# Patient Record
Sex: FEMALE | Race: WHITE
[De-identification: names, ages, dates, MRNs, and addresses within clinical notes are randomized per-mention and may not be internally consistent; named-entity substitution may affect disease eponyms.]

---

## 2018-01-21 ENCOUNTER — HOSPITAL ENCOUNTER (EMERGENCY)
Dept: HOSPITAL 35 - ER | Age: 48
Discharge: HOME | End: 2018-01-21
Payer: COMMERCIAL

## 2018-01-21 VITALS — DIASTOLIC BLOOD PRESSURE: 93 MMHG | SYSTOLIC BLOOD PRESSURE: 137 MMHG

## 2018-01-21 VITALS — HEIGHT: 66 IN | BODY MASS INDEX: 27.32 KG/M2 | WEIGHT: 170 LBS

## 2018-01-21 DIAGNOSIS — Z90.710: ICD-10-CM

## 2018-01-21 DIAGNOSIS — Z98.890: ICD-10-CM

## 2018-01-21 DIAGNOSIS — R01.1: ICD-10-CM

## 2018-01-21 DIAGNOSIS — I80.9: Primary | ICD-10-CM

## 2018-09-27 ENCOUNTER — HOSPITAL ENCOUNTER (OUTPATIENT)
Dept: HOSPITAL 35 - NEURO | Age: 48
End: 2018-09-27
Attending: PSYCHIATRY & NEUROLOGY
Payer: COMMERCIAL

## 2018-09-27 DIAGNOSIS — R55: ICD-10-CM

## 2018-09-27 DIAGNOSIS — R42: ICD-10-CM

## 2018-09-27 DIAGNOSIS — R51: Primary | ICD-10-CM

## 2021-01-11 ENCOUNTER — HOSPITAL ENCOUNTER (INPATIENT)
Dept: HOSPITAL 35 - ER | Age: 51
LOS: 2 days | Discharge: HOME | DRG: 64 | End: 2021-01-13
Attending: FAMILY MEDICINE | Admitting: FAMILY MEDICINE
Payer: COMMERCIAL

## 2021-01-11 VITALS — SYSTOLIC BLOOD PRESSURE: 161 MMHG | DIASTOLIC BLOOD PRESSURE: 105 MMHG

## 2021-01-11 VITALS — SYSTOLIC BLOOD PRESSURE: 157 MMHG | DIASTOLIC BLOOD PRESSURE: 106 MMHG

## 2021-01-11 VITALS — DIASTOLIC BLOOD PRESSURE: 106 MMHG | SYSTOLIC BLOOD PRESSURE: 157 MMHG

## 2021-01-11 VITALS — HEIGHT: 65.98 IN | BODY MASS INDEX: 25.71 KG/M2 | WEIGHT: 160 LBS

## 2021-01-11 VITALS — SYSTOLIC BLOOD PRESSURE: 223 MMHG | DIASTOLIC BLOOD PRESSURE: 133 MMHG

## 2021-01-11 VITALS — DIASTOLIC BLOOD PRESSURE: 93 MMHG | SYSTOLIC BLOOD PRESSURE: 147 MMHG

## 2021-01-11 VITALS — SYSTOLIC BLOOD PRESSURE: 198 MMHG | DIASTOLIC BLOOD PRESSURE: 107 MMHG

## 2021-01-11 VITALS — DIASTOLIC BLOOD PRESSURE: 106 MMHG | SYSTOLIC BLOOD PRESSURE: 172 MMHG

## 2021-01-11 DIAGNOSIS — I16.0: ICD-10-CM

## 2021-01-11 DIAGNOSIS — Z79.899: ICD-10-CM

## 2021-01-11 DIAGNOSIS — U07.1: ICD-10-CM

## 2021-01-11 DIAGNOSIS — Z90.710: ICD-10-CM

## 2021-01-11 DIAGNOSIS — Z90.49: ICD-10-CM

## 2021-01-11 DIAGNOSIS — D75.1: ICD-10-CM

## 2021-01-11 DIAGNOSIS — M35.9: ICD-10-CM

## 2021-01-11 DIAGNOSIS — I63.89: Primary | ICD-10-CM

## 2021-01-11 DIAGNOSIS — I10: ICD-10-CM

## 2021-01-11 LAB
ALBUMIN SERPL-MCNC: 4.4 G/DL (ref 3.4–5)
ALT SERPL-CCNC: 17 U/L (ref 14–59)
ANION GAP SERPL CALC-SCNC: 11 MMOL/L (ref 7–16)
ANISOCYTOSIS BLD QL SMEAR: (no result)
AST SERPL-CCNC: 23 U/L (ref 15–37)
BASOPHILS NFR BLD AUTO: 2 % (ref 0–2)
BILIRUB SERPL-MCNC: 1 MG/DL (ref 0.2–1)
BILIRUB UR-MCNC: NEGATIVE MG/DL
BUN SERPL-MCNC: 17 MG/DL (ref 7–18)
CALCIUM SERPL-MCNC: 9.7 MG/DL (ref 8.5–10.1)
CHLORIDE SERPL-SCNC: 101 MMOL/L (ref 98–107)
CO2 SERPL-SCNC: 24 MMOL/L (ref 21–32)
COLOR UR: YELLOW
CREAT SERPL-MCNC: 1 MG/DL (ref 0.6–1)
EOSINOPHIL NFR BLD: 1 % (ref 0–3)
ERYTHROCYTE [DISTWIDTH] IN BLOOD BY AUTOMATED COUNT: 17.8 % (ref 10.5–14.5)
GLUCOSE SERPL-MCNC: 93 MG/DL (ref 74–106)
GRANULOCYTES NFR BLD MANUAL: 82 % (ref 36–66)
HCT VFR BLD CALC: 65.6 % (ref 37–47)
HGB BLD-MCNC: 21 GM/DL (ref 12–15)
KETONES UR STRIP-MCNC: NEGATIVE MG/DL
LG PLATELETS BLD QL SMEAR: (no result)
LYMPHOCYTES NFR BLD AUTO: 9 % (ref 24–44)
MCH RBC QN AUTO: 28.1 PG (ref 26–34)
MCHC RBC AUTO-ENTMCNC: 32 G/DL (ref 28–37)
MCV RBC: 87.8 FL (ref 80–100)
MONOCYTES NFR BLD: 5 % (ref 1–8)
NEUTROPHILS # BLD: 10.1 THOU/UL (ref 1.4–8.2)
NEUTS BAND NFR BLD: 1 % (ref 0–8)
PLATELET # BLD: 341 THOU/UL (ref 150–400)
POTASSIUM SERPL-SCNC: 4.1 MMOL/L (ref 3.5–5.1)
PROT SERPL-MCNC: 8.2 G/DL (ref 6.4–8.2)
RBC # BLD AUTO: 7.42 MIL/UL (ref 4.2–5)
RBC # UR STRIP: (no result) /UL
SODIUM SERPL-SCNC: 136 MMOL/L (ref 136–145)
SP GR UR STRIP: <= 1.005 (ref 1–1.03)
URINE CLARITY: CLEAR
URINE GLUCOSE-RANDOM*: NEGATIVE
URINE LEUKOCYTES-REFLEX: NEGATIVE
URINE NITRITE-REFLEX: NEGATIVE
URINE PROTEIN (DIPSTICK): NEGATIVE
UROBILINOGEN UR STRIP-ACNC: 0.2 E.U./DL (ref 0.2–1)
WBC # BLD AUTO: 12.2 THOU/UL (ref 4–11)

## 2021-01-11 PROCEDURE — 10081 I&D PILONIDAL CYST COMP: CPT

## 2021-01-11 PROCEDURE — 10879: CPT

## 2021-01-11 NOTE — HC
[place]   [address]                     CONSULTATION                  
_______________________________________________________________________________
 
Name:       XIAO MOORE   Room #:         170-9       ADM IN  
M.R.#:      [id number]                       Account #:      [id number]  
Admission:  01/11/21    Attend Phys:    Grayson Rdz MD  
Discharge:              Date of Birth:  [birth year]  
                                                          Report #: [id number]
                                                                    [id number]   
_______________________________________________________________________________
THIS REPORT FOR:  
 
cc:  Grayson Rdz MD, Neal A. MD Khosla,Eliud VÁZQUEZ MD                                         ~
 
DATE OF SERVICE:  01/11/2021
 
 
HISTORY OF PRESENT ILLNESS:  This is a 50-year-old female patient on whom I got
a call from Emergency Room because the patient had presented with some
confusion, where she was unable to do her regular job.  Emergency Room physician
did initially the CT and subsequently an MRI of the brain with and without
contrast and that demonstrated strokes, one in the left caudate nucleus area and
the other one on the right side.  Initially, they were not certain, but after
MRI of the brain with and without contrast, their impression was that they are
stroke.  The vasculature looks clean and I suggested dual antiplatelet therapy
and admission for further workup.
 
I came to see the patient in the Emergency Room; and at that time, Emergency
Room physician told me that the patient's hematocrit came out to be 65.6.  They
had already talked to Hematology and they were doing phlebotomy on her. 
Subsequently, Dr. Rdz came to Emergency Room and I discussed the patient
with him and it looks like her polycythemia is something recent.  Apparently,
her MARIALUISA has been positive according to Dr. Rdz.
 
She indicates she is not sure when these symptoms started.  She said she went to
work today and that is the time she noticed.  She went to work on Friday and she
was okay and she estimates this happened sometime between Friday and today and
these strokes are already showing up on the CT scan and on T2-weighted images
indicating that they are probably subacute.
 
REVIEW OF SYSTEMS:  A 14-point review of system was carried out and it looks
like this patient has an uncontrolled hypertension for a long time.  When she
came to Emergency Room, her blood pressure was 223/133.  She says she takes her
blood pressure, but the blood pressure runs about 190 systolic.  She was
somewhat defensive and circumvent about that.  She drinks alcohol very rarely,
about once a week or once every other week.  She does not have an established
diagnosis of polycythemia.  That was a relevant 14-point review of system.
 
PAST MEDICAL HISTORY:  Negative for stroke.
 
FAMILY HISTORY:  Negative for any polycythemia.
 
SOCIAL HISTORY:  She drinks alcohol very rarely and apparently does not smoke.
 
 
 
 

[address]
[place]   [address]                     CONSULTATION                  
_______________________________________________________________________________
 
Name:       XIAO MOORE   Room #:         1709       [facility] [place]  
.R.#:      [id number]                       Account #:      [id number]  
Admission:  01/11/21    Attend Phys:    Grayson Rdz MD  
Discharge:              Date of Birth:  [birth year]  
                                                          Report #: [id number]
                                                                    [id number]   
_______________________________________________________________________________
 
PHYSICAL EXAMINATION:  Indicates she is alert.  She is responsive.  She can talk
and she feels her memory was not good enough to carry out her job.  I did
limited examination today because she was getting phlebotomy done and we will do
the detailed examination later on.  She never noticed any focal deficit; and on
the exam I did, she does not appear to have any focal deficit.
 
IMAGING STUDIES:  I reviewed her imaging studies.  Her carotid Doppler appears
unremarkable.  She does have mild disease, but not much.
 
IMPRESSION:  This patient's strokes are most likely because of her uncontrolled
hypertension and severe polycythemia.  I discussed the patient with Dr. Rdz
and recommended a consultation with Hematology as well as Rheumatology and the
main management is going to be by them.  They had raised some question about
hemorrhage.  When I originally I got the call from Emergency Room physician, he
had mentioned that the patient had a stroke and did not mention anything about
hemorrhage and I had suggested dual platelet therapy.  CT scan has mentioned
hemorrhage, but I looked at the films.  I do not think this patient has any
prominent hemorrhage on the CT scan.  She has a pretty affected brain for her
age.  There are many chronic changes in this patient, which can be secondary to
her unexplained hypertension as well as polycythemia now.  It looks like
hypertension has been uncontrolled for a long time and that has caused multiple
changes in the brain.
 
I will discuss the scan with the radiologist, but I do not see any prominent
hemorrhage, but she does have microhemorrhages, which are expected from her
hypertension.  She is on risk for hemorrhage in future.  To be on safe side, I
will probably stay on aspirin, but main management is going to be by the
Hematology and Rheumatology because those are the prominent contribution factor
to her symptoms.  I did order an echocardiogram to complete the workup.
 
Thank you very much for this referral; and if you have any questions, please
feel free to contact me.
 
 
 
 
 
 
 
 
 
 
 
                         
   By:                               
                   
D: 01/11/21 1714                           _____________________________________
T: 01/11/21 1901                           Eliud Mcdaniels MD           /nt

## 2021-01-11 NOTE — EKG
[place] [address] [facility] [address]
[place]  [address]
Phone:  [phone]                    ELECTROCARDIOGRAM REPORT      
_______________________________________________________________________________
 
Name:       JEANCOLLETTEXIAO IRVIN   Room #:                     PRE [facility]..#:      [id number]     Account #:      [id number]  
Admission:              Attend Phys:                          
Discharge:              Date of Birth:  [birth year]  
                                                          Report #: [id number]
   [id number]
_______________________________________________________________________________
                         [facility] ED
                                       
Test Date:    2021               Test Time:    11:50:24
Pat Name:     XIAO MOORE        Department:   
Patient ID:   [id number]            Room:          
Gender:       F                        Technician:   KRYSTLE
:          1970               Requested By: Darek Bowen
Order Number: [id number] MD:   Yanick Montes
                                 Measurements
Intervals                              Axis          
Rate:         111                      P:            46
RI:           170                      QRS:          -40
QRSD:         86                       T:            37
QT:           350                                    
QTc:          476                                    
                           Interpretive Statements
Sinus tachycardia
Biatrial enlargement
Left ventricular hypertrophy
Artifact in lead(s) V1,V2,V6 and baseline wander in lead(s) V1
Compared to ECG 10/22/2010 08:02:55
Electronically Signed On 2021 12:12:09 CST by Yanick Montes
[email or web address]
 
 
 
 
 
 
 
 
 
 
 
 
 
 
 
 
 
 
 
 
  <ELECTRONICALLY SIGNED>
   By: Yanick Montes MD        
  21     1212
D: 21 1150                           _____________________________________
T: 21 1150                           Yanick Montes MD          /SHIVA

## 2021-01-11 NOTE — NUR
PT TAKEN TO MRI. DELAY IN NIHSS ASSESSMENT SECONDARY TO PT OFF THE UNIT. NIHSS
OF 0 COMPLETED UPON RETURN TO ER

## 2021-01-11 NOTE — NUR
CAME TO THE ED UPON REQUEST OF DR. OLIVERA TO PERFORM A THERAPEUTIC PHLEBOTOMY
FOR PT. EXPLANATION GIVEN TO PT AND , SEEN BY DR. MENSAH AND DR. TROY. PHLEBOTOMY PERFORMED WITHOUT INCIDENT, PRESSURE APPLIED TO LEFT AC AND
COBAN WRAP TO ARM. PT AWARE THAT THIS MAY BE REPEATED DAILY.

## 2021-01-12 VITALS — SYSTOLIC BLOOD PRESSURE: 152 MMHG | DIASTOLIC BLOOD PRESSURE: 97 MMHG

## 2021-01-12 VITALS — DIASTOLIC BLOOD PRESSURE: 95 MMHG | SYSTOLIC BLOOD PRESSURE: 171 MMHG

## 2021-01-12 LAB
ANION GAP SERPL CALC-SCNC: 11 MMOL/L (ref 7–16)
BUN SERPL-MCNC: 12 MG/DL (ref 7–18)
CALCIUM SERPL-MCNC: 8.9 MG/DL (ref 8.5–10.1)
CHLORIDE SERPL-SCNC: 104 MMOL/L (ref 98–107)
CO2 SERPL-SCNC: 22 MMOL/L (ref 21–32)
CREAT SERPL-MCNC: 0.8 MG/DL (ref 0.6–1)
ERYTHROCYTE [DISTWIDTH] IN BLOOD BY AUTOMATED COUNT: 17.7 % (ref 10.5–14.5)
GLUCOSE SERPL-MCNC: 75 MG/DL (ref 74–106)
HCT VFR BLD CALC: 57.7 % (ref 37–47)
HGB BLD-MCNC: 18.1 GM/DL (ref 12–15)
MCH RBC QN AUTO: 27.6 PG (ref 26–34)
MCHC RBC AUTO-ENTMCNC: 31.3 G/DL (ref 28–37)
MCV RBC: 88.1 FL (ref 80–100)
PLATELET # BLD: 336 THOU/UL (ref 150–400)
POTASSIUM SERPL-SCNC: 3.8 MMOL/L (ref 3.5–5.1)
RBC # BLD AUTO: 6.55 MIL/UL (ref 4.2–5)
SODIUM SERPL-SCNC: 137 MMOL/L (ref 136–145)
WBC # BLD AUTO: 11.9 THOU/UL (ref 4–11)

## 2021-01-12 NOTE — NUR
ORDERS RECEIVED FOR EVAL AND TREAT. WHEN ATTEMPTED EVAL ON 2N, Pt'S NURSE,
AZUL, STATED THAT Pt JUST TRANSFERRED TO 3W. SHE ALSO STATES THAT Pt DOESN'T
NEED ANY P.T. AS SHE IS UP AMBULATING INDEPENDENTLY AND HAS NO C/O'S LE'S
SYMPTOMS. P.T. THEN WENT UP TO 3W TO SPEAK TO Pt TO VERIFY AND SEE IF Pt WOULD
LIKE TO WORK WITH P.T.. Pt DENIES ANY N/T IN LE'S AS WELL AS NO WEAKNESS. SHE
STATES THAT SHE HAS BEEN WALKING AROUND WITH NO BALANCE DEFICITS. P.T.
OBSERVED Pt AMBULATING INDEPENDENTLY IN HER ROOM WITH NO GAIT DEFICITS. Pt
STATES THAT SHE DOES NOT WANT ANY P.T. AND PER OBSERVATION AND INTERVIEWING
Pt, Pt DOES NOT QUALIFY FOR SKILLED P.T. AT THIS TIME. WILL D/C P.T.

## 2021-01-12 NOTE — HC
[facility]
Russ Ortiz
[place]   [address]                     CONSULTATION                  
_______________________________________________________________________________
 
Name:       XIAO MOORE   Room #:         361-P       ADM IN  
M.R.#:      [id number]                       Account #:      [id number]  
Admission:  01/11/21    Attend Phys:    Grayson Rdz MD  
Discharge:              Date of Birth:  [birth year]  
                                                          Report #: [id number]
                                                                    [id number]   
_______________________________________________________________________________
THIS REPORT FOR:  
 
cc:  Grayson Rdz MD, Neal A. MD Barry, Joseph W. MD                                           ~
 
DATE OF SERVICE:  01/12/2021
 
 
INFECTIOUS DISEASE CONSULTATION
 
ATTENDING PHYSICIAN:  Dr. Rdz.
 
REASON FOR EVALUATION:  COVID-19 infection.
 
HISTORY OF SUBJECTIVE:  Chart reviewed, patient examined.  This is a 50-year-old
without significant medical history, does have polycythemia apparently, who
presented with complaints of confusion.  She notes really no pulmonary or
gastrointestinal related complaints.  As per the evaluation, underwent CT
imaging, which showed right posterior temporoparietal infarct 1.8 x 3.3 cm in
size, second abnormality involving the left caudate nucleus.  Chest x-ray was
otherwise unrevealing.  Urinalysis unremarkable as well.  The MRI of the head
was done as a result of the initial abnormalities showed region diffusion signal
abnormality involving the left basal ganglia and deep matter involving the
caudate head and the lentiform nucleus in the right posterior temporal lobe site
as noted above.  There is a question of acute to subacute and timing.  As per
the evaluation, did undergo coronavirus testing, which was found to be positive.
 At this point, she has no particular complaints.  She is sitting in the chair. 
She is maintained on ambient air.  She has been afebrile.
 
ALLERGIES:  None known.
 
MEDICATIONS:  Include clopidogrel, aspirin, olmesartan, labetalol.
 
PAST MEDICAL HISTORY:  History of anemia, but now she has polycythemia,
hypertensive.
 
SOCIAL HISTORY:  Nonsmoker, occasional ethanol, no illicit drug use.
 
FAMILY HISTORY:  Noncontributory.
 
REVIEW OF SYSTEMS:  Otherwise, unremarkable.  Denies any GI or pulmonary-related
complaints.
 
PHYSICAL EXAMINATION:
GENERAL:  She is alert, cooperative, appropriate.  She is not overtly
 
 
 
[facility]
[address] [place]   [address]                     CONSULTATION                  
_______________________________________________________________________________
 
Name:       XIAO MOORE   Room #:         361-P       [facility] [place]  
..#:      [id number]                       Account #:      [id number]  
Admission:  01/11/21    Attend Phys:    Grayson Rdz MD  
Discharge:              Date of Birth:  [birth year]  
                                                          Report #: [id number]
                                                                    [id number]   
_______________________________________________________________________________
 
distressed.
VITAL SIGNS:  Temperature 98.3, pulse 72, respirations 16, blood pressure
147/93.
SKIN:  Warm, dry, no rashes.
HEENT:  Otherwise unremarkable.  Normocephalic.  Extraocular muscles intact.
NECK:  Supple.
LUNGS:  Clear to auscultation bilaterally.
HEART:  Regular.  I do not appreciate a murmur.
ABDOMEN:  Soft, nontender, nondistended.
EXTREMITIES:  No cyanosis.
GENITOURINARY AND RECTAL:  Deferred.
 
LABORATORY DATA:  Electrolytes from this morning, sodium 137, potassium 3.8,
chloride 104, bicarbonate 22, anion gap of 11, BUN and creatinine 12 and 0.8,
glucose of 75.  CBC:  White count 11.9, H and H 18.1, is down from 21 and
hematocrit 57.7, platelet count of 336.  Coronavirus testing was positive as
noted above.  Ultrasound of the carotids showed mild bilateral atherosclerotic
plaquing without significant stenosis.  MRI of the head was noted.  Urinalysis
unremarkable.  TSH 1.727.  Liver functions were otherwise unremarkable. 
Alkaline phosphatase borderline elevated at 117, albumin 44, total protein is
8.2.
 
ASSESSMENT:  COVID-19 infection.  Apparently, this is asymptomatic at this
point.  Cannot entirely exclude some sort of a complicating issue in terms of
the stroke, but has polycythemia, also hypertension, apparently on presentation.
 There is heme evaluation as well as neuro evaluation in progress.  At this
point, I do not think there is a role for antimicrobials, monitor closely and if
his clinical course would change, would consider adding therapy.
 
 
 
 
 
 
 
 
 
 
 
 
 
 
 
  <ELECTRONICALLY SIGNED>
   By: Carlos Marte MD           
  01/12/21     1334
D: 01/12/21 1215                           _____________________________________
T: 01/12/21 1236                           Carlos Marte MD             /nt

## 2021-01-12 NOTE — NUR
RN ASSUMED PT'S CARE AT 1200PM, PT TRANSFERED FROM  , BECAUSED PT 'S COVID
IS POSITIVE FROM 01/11/21 TEST , PT IS ON COVID ISOLATION , BUT PT DOES NOT
HAVE SOB AND FEVER, PT'S VS ARE STABLE, PT GETS UP TO CHAIR AND BATH ROOM BY
HER SELF.

## 2021-01-12 NOTE — NUR
PATIENT ARRIVED TO ROOM 215 AROUND SHIFT CHANGE.A/O X 4.DENIES PAIN.UP
INDEPENDENTLY.NIH SCORE IS 0.CORONAVIRUS PCR IS POSITIVE THIS
MORNING;DOCTOR,HOUSE SUP AND CHARGE RN IS AWARE.PT WILL BE GOING TO ROOM
361.REPORT GIVEN TO DAY SHIFT RN.MONITOR SHOWS SR.POC CONTINUED.

## 2021-01-13 VITALS — SYSTOLIC BLOOD PRESSURE: 180 MMHG | DIASTOLIC BLOOD PRESSURE: 109 MMHG

## 2021-01-13 VITALS — DIASTOLIC BLOOD PRESSURE: 80 MMHG | SYSTOLIC BLOOD PRESSURE: 131 MMHG

## 2021-01-13 VITALS — SYSTOLIC BLOOD PRESSURE: 144 MMHG | DIASTOLIC BLOOD PRESSURE: 86 MMHG

## 2021-01-13 VITALS — DIASTOLIC BLOOD PRESSURE: 109 MMHG | SYSTOLIC BLOOD PRESSURE: 180 MMHG

## 2021-01-13 VITALS — DIASTOLIC BLOOD PRESSURE: 93 MMHG | SYSTOLIC BLOOD PRESSURE: 142 MMHG

## 2021-01-13 VITALS — SYSTOLIC BLOOD PRESSURE: 154 MMHG | DIASTOLIC BLOOD PRESSURE: 90 MMHG

## 2021-01-13 VITALS — DIASTOLIC BLOOD PRESSURE: 79 MMHG | SYSTOLIC BLOOD PRESSURE: 130 MMHG

## 2021-01-13 LAB
ALBUMIN SERPL-MCNC: 3.4 G/DL (ref 3.4–5)
ALT SERPL-CCNC: 16 U/L (ref 30–65)
ANION GAP SERPL CALC-SCNC: 12 MMOL/L (ref 7–16)
AST SERPL-CCNC: 24 U/L (ref 15–37)
BILIRUB SERPL-MCNC: 0.9 MG/DL (ref 0.2–1)
BUN SERPL-MCNC: 10 MG/DL (ref 7–18)
CALCIUM SERPL-MCNC: 8.9 MG/DL (ref 8.5–10.1)
CHLORIDE SERPL-SCNC: 104 MMOL/L (ref 98–107)
CHOLEST SERPL-MCNC: 158 MG/DL (ref ?–200)
CO2 SERPL-SCNC: 26 MMOL/L (ref 21–32)
CREAT SERPL-MCNC: 1 MG/DL (ref 0.6–1)
ERYTHROCYTE [DISTWIDTH] IN BLOOD BY AUTOMATED COUNT: 17.4 % (ref 10.5–14.5)
GLUCOSE SERPL-MCNC: 60 MG/DL (ref 74–106)
HCT VFR BLD CALC: 58.3 % (ref 37–47)
HDLC SERPL-MCNC: 37 MG/DL (ref 40–?)
HGB BLD-MCNC: 18.5 GM/DL (ref 12–15)
LDLC SERPL-MCNC: 109 MG/DL (ref ?–100)
MCH RBC QN AUTO: 27.9 PG (ref 26–34)
MCHC RBC AUTO-ENTMCNC: 31.7 G/DL (ref 28–37)
MCV RBC: 88.1 FL (ref 80–100)
PLATELET # BLD: 348 THOU/UL (ref 150–400)
POTASSIUM SERPL-SCNC: 3.8 MMOL/L (ref 3.5–5.1)
PROT SERPL-MCNC: 6.5 G/DL (ref 6.4–8.2)
RBC # BLD AUTO: 6.62 MIL/UL (ref 4.2–5)
SODIUM SERPL-SCNC: 142 MMOL/L (ref 136–145)
TC:HDL: 4.3 RATIO
TRIGL SERPL-MCNC: 64 MG/DL (ref ?–150)
VLDLC SERPL CALC-MCNC: 13 MG/DL (ref ?–40)
WBC # BLD AUTO: 11.5 THOU/UL (ref 4–11)

## 2021-01-13 NOTE — NUR
ASSUME CARE 1900. PT/VITALS STABLE. DENEIS ANY PAIN.  NO
NUMBNESS/TINGLING/WEAKNESS INDICATED. MARROQUIN WITH NO DRIFTING NOTED. NIH OF 0.
TOLERATES ACTIVITY WELL. ASSESSMENT AS CHARTED. PROGRESSING WELL WITH POC.
PLAN IS FOR PT TO HAVE A THERAPEUTIC PHLEBOTOMY PROCEDURE DONE AND THEN
POSSIBLE DISCHARGE LATEER. WILL CONTINUE TO MONITOR

## 2021-01-13 NOTE — NUR
INITIAL ASSESSMENT/DISCHARGE NOTE:
SW reviewed chart and spoke with nursing. Pt was admitted from home due to
CVA. Pt was transferred to  from  yesterday following positive COVID test.
Pt placed in Enhanced Isolation. Pt had repeat COVID test today, which is
negative. Pt may discharge home later today. SW spoke with pt via phone.
Introduced role of SW. Pt is alert/orientated x 4. Pt reports she lives at
home with her family. Prior to admission, pt was independent with ADLs. No use
of DME. No hx of  services or post-acute care. Pt's PCP is Dr. Rdz. Pt
is employed full-time. Pt denies having any discharge needs. Pt's family will
provide transportation home. SW is following to assist should needs arise.

## 2021-01-13 NOTE — NUR
RECEIVED CALL FROM 3W RN AND DR. MENSAH INDICATING PT IS TO HAVE ANOTHER
THERAPEUTIC PHLEBOTOMY TODAY. WENT TO THE UNIT, GISELL OFF NEARLY ONE FULL UNIT
BEFORE FLOW SLOWED SO STOPPED, REMOVED WEIGHT WAS 14OZ. PT TOLERATED WELL.
DISCUSSED WITH PT THE NEED TO HAVE ANOTHER UNIT REMOVED TOMORROW BUT LOCATION
TO BE DETERMINED. PT HOPES TO BE DISMISSED TODAY. WILL DISCUSS PLAN WITH DR. MENSAH AND GET ORDER CONFIRMATION FOR PHLEBOTOMY BEFORE PT'S DISMISSAL

## 2021-01-13 NOTE — NUR
RN ASSUMED PT'S CARE AT 0700AM, PT IS A&OX3 , BUT PT IS FORGETFUL, PT'S VS ARE
STABLE, PT DENIEED SOB , PT'S SECOND COVID TEST WAS NEGATIVE TODAY, PT HAD
DONE HER CTA HEAD AND NECK, RENAL ULTRASOUND AND PHLEBOTOMY TODAY, RN RECEIVED
ORDER TO DC PT TO HOME, RN HAD GIVING DC TEACHING TO PT AND HER DAUGHTER ,
THEY UNDERSTAND WELL , PT'S   PT AT 1830PM.

## 2021-01-14 ENCOUNTER — HOSPITAL ENCOUNTER (OUTPATIENT)
Dept: HOSPITAL 35 - OPONC | Age: 51
End: 2021-01-14
Attending: FAMILY MEDICINE
Payer: COMMERCIAL

## 2021-01-14 VITALS — SYSTOLIC BLOOD PRESSURE: 147 MMHG | DIASTOLIC BLOOD PRESSURE: 97 MMHG

## 2021-01-14 VITALS — DIASTOLIC BLOOD PRESSURE: 91 MMHG | SYSTOLIC BLOOD PRESSURE: 145 MMHG

## 2021-01-14 DIAGNOSIS — D75.1: Primary | ICD-10-CM

## 2021-01-14 PROCEDURE — 95100: CPT

## 2021-01-14 NOTE — NUR
IN FOR THERFAPEUTIC PHLEBOTOMY FOR POLYCYTHEMIA. STATED FEELING WELL. ACCESSED
RAC WITH #16 NEEDLE TO BLOOD BANK BAG. SCALE ZEROED PRIOR TO PHLEBOTOMY.
REMOVED 16 OZ., 500 ML WITHOUT DIFFICULTY. VITAL SIGNS STABLE. DRANK A COUPLE
OF GLASSES OF WATER AND GAVE 500 ML SALINE BOLUS OVER 30 MINUTES. IV TEAM
PLACED PERIPHERAL IV FOR SALINE BOLUS. OBSERVED PATIENT FOR 30 MINUTES.
REMOVED IV AND DISMISSED AMBULATORY IN GOOD CONDITION.

## 2021-01-18 ENCOUNTER — HOSPITAL ENCOUNTER (OUTPATIENT)
Dept: HOSPITAL 35 - OPONC | Age: 51
End: 2021-01-18
Attending: FAMILY MEDICINE
Payer: COMMERCIAL

## 2021-01-18 VITALS — SYSTOLIC BLOOD PRESSURE: 145 MMHG | DIASTOLIC BLOOD PRESSURE: 85 MMHG

## 2021-01-18 VITALS — SYSTOLIC BLOOD PRESSURE: 121 MMHG | DIASTOLIC BLOOD PRESSURE: 80 MMHG

## 2021-01-18 DIAGNOSIS — D75.1: Primary | ICD-10-CM

## 2021-01-18 LAB
COMPLEMENT-C3: 105 MG/DL (ref 82–167)
COMPLEMENT-C4: 21 MG/DL (ref 12–38)
HCT VFR BLD CALC: 54.3 % (ref 37–47)
HGB BLD-MCNC: 17.9 GM/DL (ref 12–15)

## 2021-01-18 PROCEDURE — 95100: CPT

## 2021-01-18 NOTE — NUR
IN FOR THERAPEUTIC PHLEBOTOMY FOR POLYCYTHEMIA. STATED FEELING WELL TODAY.
H&H DRAWN AND RESULTED. HGB 17.9/HCT 54.3. USING BLOOD BANK BAG AND PRE
CALIBRATED SCALE ACCESSED LAC WITHOUT DIFFICULTY AND REMOVED 1 UNIT, 16 OZ,
500ML OF BLOOD. PATIENT STATED FEELING FUNNY AFTER PHLEBOTOMY. ELEVATED LEGS
AND DRANK 2 GLASSES OF WATER. /80. FUNNY FEELING WAS RESOLVED WITHIN 5
MINUTES. RECEIVED 500 ML SALINE IV BOLUS AFTER PHLEBOTOMY. TOLERATED WELL.
ENCOURAGED PATIENT TO EAT A GOOD MEAL BEFORE HER TREATMENT AND DRINK PLENTY OF
FLUIDS. STATED UNDERSTANDING. REMOVED IV AND DISMISSED IN STABLE CONDITION.

## 2021-01-21 ENCOUNTER — HOSPITAL ENCOUNTER (OUTPATIENT)
Dept: HOSPITAL 35 - SJCVCIMAG | Age: 51
End: 2021-01-21
Attending: FAMILY MEDICINE
Payer: COMMERCIAL

## 2021-01-21 ENCOUNTER — HOSPITAL ENCOUNTER (OUTPATIENT)
Dept: HOSPITAL 35 - RAD | Age: 51
End: 2021-01-21
Attending: FAMILY MEDICINE
Payer: COMMERCIAL

## 2021-01-21 DIAGNOSIS — Z12.31: Primary | ICD-10-CM

## 2021-01-21 DIAGNOSIS — I11.9: Primary | ICD-10-CM

## 2021-02-15 ENCOUNTER — HOSPITAL ENCOUNTER (OUTPATIENT)
Dept: HOSPITAL 35 - CAT | Age: 51
End: 2021-02-15
Attending: INTERNAL MEDICINE
Payer: COMMERCIAL

## 2021-02-15 DIAGNOSIS — E78.00: ICD-10-CM

## 2021-02-15 DIAGNOSIS — I25.10: ICD-10-CM

## 2021-02-15 DIAGNOSIS — Z13.6: Primary | ICD-10-CM

## 2022-02-03 ENCOUNTER — HOSPITAL ENCOUNTER (OUTPATIENT)
Dept: HOSPITAL 35 - BC | Age: 52
End: 2022-02-03
Attending: FAMILY MEDICINE
Payer: COMMERCIAL

## 2022-02-03 DIAGNOSIS — Z12.31: Primary | ICD-10-CM
